# Patient Record
Sex: FEMALE | Race: BLACK OR AFRICAN AMERICAN | NOT HISPANIC OR LATINO | URBAN - METROPOLITAN AREA
[De-identification: names, ages, dates, MRNs, and addresses within clinical notes are randomized per-mention and may not be internally consistent; named-entity substitution may affect disease eponyms.]

---

## 2020-07-25 ENCOUNTER — TELEPHONE ENCOUNTER (OUTPATIENT)
Dept: URBAN - METROPOLITAN AREA CLINIC 13 | Facility: CLINIC | Age: 75
End: 2020-07-25

## 2020-07-25 RX ORDER — LUBIPROSTONE 8 UG/1
TAKE 1 CAPSULE TWICE DAILY CAPSULE, GELATIN COATED ORAL
Qty: 60 | Refills: 6 | OUTPATIENT
Start: 2012-03-27 | End: 2012-04-24

## 2020-07-25 RX ORDER — ALLOPURINOL 100 MG/1
TAKE 1 TABLET DAILY TABLET ORAL
Refills: 0 | OUTPATIENT
Start: 2011-08-23 | End: 2017-06-14

## 2020-07-25 RX ORDER — NITROFURANTOIN MONOHYDRATE/MACROCRYSTALLINE 25; 75 MG/1; MG/1
CAPSULE ORAL
Qty: 14 | Refills: 0 | OUTPATIENT
Start: 2012-05-14 | End: 2012-11-19

## 2020-07-25 RX ORDER — ALBUTEROL SULFATE 108 UG/1
INHALE 2 PUFFS FOUR TIMES DAILY AS DIRECTED AEROSOL, METERED RESPIRATORY (INHALATION)
Refills: 0 | OUTPATIENT
End: 2012-11-19

## 2020-07-25 RX ORDER — VALSARTAN 320 MG/1
TAKE 0.5 TABLET DAILY TABLET ORAL
Refills: 0 | OUTPATIENT
Start: 2011-08-01 | End: 2017-06-14

## 2020-07-25 RX ORDER — FAMOTIDINE 40 MG/1
TAKE 1 TABLET DAILY TABLET ORAL
Qty: 30 | Refills: 11 | OUTPATIENT
Start: 2012-05-21 | End: 2019-06-25

## 2020-07-25 RX ORDER — DEXLANSOPRAZOLE 60 MG/1
TAKE 1 CAPSULE BY MOUTH EACH MORNING CAPSULE, DELAYED RELEASE ORAL
Refills: 3 | OUTPATIENT
End: 2018-10-25

## 2020-07-25 RX ORDER — GABAPENTIN 300 MG/1
TAKE 1 CAPSULE DAILY PRN CAPSULE ORAL
Refills: 0 | OUTPATIENT
End: 2018-08-20

## 2020-07-25 RX ORDER — ALBUTEROL SULFATE 1.25 MG/3ML
USE 1 VIAL IN NEBULIZER 3 TIMES DAILY AS NEEDED, PT STATES UNKNOWN DOSE SOLUTION INTRABRONCHIAL
Refills: 0 | OUTPATIENT
End: 2019-02-27

## 2020-07-25 RX ORDER — THEOPHYLLINE ANHYDROUS 400 MG/1
TAKE 1 CAPSULE DAILY CAPSULE, EXTENDED RELEASE ORAL
Refills: 0 | OUTPATIENT
End: 2012-11-19

## 2020-07-25 RX ORDER — DEXLANSOPRAZOLE 60 MG/1
TAKE 1 CAPSULE DAILY EVERY MORNING BEFORE BREAKFAST CAPSULE, DELAYED RELEASE ORAL
Qty: 30 | Refills: 1 | OUTPATIENT
Start: 2012-02-27 | End: 2012-04-24

## 2020-07-25 RX ORDER — LORAZEPAM 1 MG/1
TAKE 1 TABLET EVERY 8 HOURS PRN TABLET ORAL
Qty: 90 | Refills: 0 | OUTPATIENT
Start: 2011-09-21 | End: 2018-08-20

## 2020-07-25 RX ORDER — ROSUVASTATIN CALCIUM 20 MG
TABLET ORAL
Qty: 30 | Refills: 0 | OUTPATIENT
Start: 2011-11-23 | End: 2012-02-27

## 2020-07-25 RX ORDER — COLCHICINE 0.6 MG/1
TAKE 1 TABLET DAILY TABLET, FILM COATED ORAL
Qty: 3 | Refills: 0 | OUTPATIENT
Start: 2012-01-24 | End: 2012-11-19

## 2020-07-25 RX ORDER — FERROUS SULFATE 137(45) MG
TAKE 1 TABLET DAILY TABLET, EXTENDED RELEASE ORAL
Refills: 0 | OUTPATIENT
End: 2012-12-31

## 2020-07-26 ENCOUNTER — TELEPHONE ENCOUNTER (OUTPATIENT)
Dept: URBAN - METROPOLITAN AREA CLINIC 13 | Facility: CLINIC | Age: 75
End: 2020-07-26

## 2020-07-26 RX ORDER — OFLOXACIN 3 MG/ML
INSTILL 10 DROPS INTO AFFECTED EAR EVERY DAY FOR 7 DAYS SOLUTION AURICULAR (OTIC)
Qty: 10 | Refills: 0 | Status: ACTIVE | COMMUNITY
Start: 2018-08-31

## 2020-07-26 RX ORDER — CIPROFLOXACIN AND DEXAMETHASONE 3; 1 MG/ML; MG/ML
SUSPENSION/ DROPS AURICULAR (OTIC)
Qty: 8 | Refills: 0 | Status: ACTIVE | COMMUNITY
Start: 2012-12-27

## 2020-07-26 RX ORDER — OLOPATADINE HYDROCHLORIDE 7 MG/ML
APPLY 1 DROP INTO EACH EYE ONCE A DAY SOLUTION OPHTHALMIC
Qty: 3 | Refills: 0 | Status: ACTIVE | COMMUNITY
Start: 2018-03-02

## 2020-07-26 RX ORDER — LOSARTAN POTASSIUM 25 MG/1
TAKE 1 TABLET BY MOUTH EVERY DAY. THIS TAKES THE PLACE OF VALSARTAN TABLET, FILM COATED ORAL
Qty: 30 | Refills: 0 | Status: ACTIVE | COMMUNITY
Start: 2018-08-21

## 2020-07-26 RX ORDER — VALSARTAN 160 MG/1
TAKE 1 TABLET DAILY TABLET ORAL
Refills: 0 | Status: ACTIVE | COMMUNITY

## 2020-07-26 RX ORDER — DIPHENOXYLATE HYDROCHLORIDE AND ATROPINE SULFATE 2.5; .025 MG/1; MG/1
TAKE AS DIRECTED EVERY 8 HOURS AS NEEDED FOR DIARRHEA TABLET ORAL
Qty: 30 | Refills: 0 | Status: ACTIVE | COMMUNITY
Start: 2012-05-24

## 2020-07-26 RX ORDER — TIOTROPIUM BROMIDE 18 UG/1
INHALE CONTENTS OF CAPSULE ONCE DAY CAPSULE ORAL; RESPIRATORY (INHALATION)
Refills: 0 | Status: ACTIVE | COMMUNITY

## 2020-07-26 RX ORDER — FLUTICASONE PROPIONATE AND SALMETEROL 50; 250 UG/1; UG/1
INHALE 1 PUFFS DAILY POWDER RESPIRATORY (INHALATION)
Refills: 0 | Status: ACTIVE | COMMUNITY

## 2020-07-26 RX ORDER — LORAZEPAM 0.5 MG/1
TAKE 1 TABLET BY MOUTH AT BEDTIME AS NEEDED TABLET ORAL
Qty: 15 | Refills: 0 | Status: ACTIVE | COMMUNITY
Start: 2018-09-04

## 2020-07-26 RX ORDER — NYSTATIN 100000 [USP'U]/ML
SWISH AND SWALLOW 2 TEASPOONSFULS (10ML) BY MOUTH 3 TIMES A DAY FOR 7 SUSPENSION ORAL
Qty: 210 | Refills: 0 | Status: ACTIVE | COMMUNITY
Start: 2011-12-01

## 2020-07-26 RX ORDER — AZITHROMYCIN DIHYDRATE 250 MG/1
TAKE 2 TABLETS BY MOUTH TODAY, THEN TAKE 1 TABLET DAILY FOR 4 DAYS TABLET, FILM COATED ORAL
Qty: 6 | Refills: 0 | Status: ACTIVE | COMMUNITY
Start: 2019-01-20

## 2020-07-26 RX ORDER — FLUTICASONE PROPIONATE AND SALMETEROL 50; 250 UG/1; UG/1
POWDER RESPIRATORY (INHALATION)
Qty: 60 | Refills: 0 | Status: ACTIVE | COMMUNITY
Start: 2011-11-19

## 2020-07-26 RX ORDER — CETIRIZINE HYDROCHLORIDE 10 MG/1
TAKE 1 TABLET BY MOUTH EVERY DAY TABLET, FILM COATED ORAL
Qty: 30 | Refills: 0 | Status: ACTIVE | COMMUNITY
Start: 2018-07-19

## 2020-07-26 RX ORDER — HYDROCODONE BITARTRATE AND ACETAMINOPHEN 5; 325 MG/1; MG/1
TAKE 1 TABLET BY MOUTH TWICE A DAY AS NEEDED FOR 10 DAYS TABLET ORAL
Qty: 20 | Refills: 0 | Status: ACTIVE | COMMUNITY
Start: 2019-01-14

## 2020-07-26 RX ORDER — MONTELUKAST SODIUM 10 MG/1
TAKE 1 TABLET BY MOUTH EVERY DAY TABLET, FILM COATED ORAL
Qty: 30 | Refills: 0 | Status: ACTIVE | COMMUNITY
Start: 2018-01-25

## 2020-07-26 RX ORDER — DICYCLOMINE HYDROCHLORIDE 10 MG/1
TAKE 1 CAPSULE EVERY 6 HOURS PRN ABDOMINAL PAIN CAPSULE ORAL
Qty: 60 | Refills: 3 | Status: ACTIVE | COMMUNITY
Start: 2018-10-25

## 2020-07-26 RX ORDER — METRONIDAZOLE 250 MG/1
TAKE 1 TABLET BY MOUTH 4 TIMES A DAY AT MEALS & BEDTIME FOR 14 DAYS TABLET ORAL
Qty: 56 | Refills: 0 | Status: ACTIVE | COMMUNITY
Start: 2011-11-09

## 2020-07-26 RX ORDER — FEBUXOSTAT 80 MG/1
TAKE 1 TABLET BY MOUTH EVERY DAY AS NEEDED TABLET ORAL
Qty: 30 | Refills: 0 | Status: ACTIVE | COMMUNITY
Start: 2018-11-01

## 2020-07-26 RX ORDER — DEXAMETHASONE 4 MG/1
TAKE 1 TABLET BY MOUTH EVERY DAY TABLET ORAL
Qty: 4 | Refills: 0 | Status: ACTIVE | COMMUNITY
Start: 2018-07-22

## 2020-07-26 RX ORDER — OXYCODONE AND ACETAMINOPHEN 5; 325 MG/1; MG/1
TAKE 1 TABLET BY MOUTH EVERY 4 TO 6 HOURS AS NEEDED FOR PAIN TABLET ORAL
Qty: 15 | Refills: 0 | Status: ACTIVE | COMMUNITY
Start: 2011-03-22

## 2020-07-26 RX ORDER — CHLORHEXIDINE GLUCONATE 4 %
TAKE 1 TABLET DAILY LIQUID (ML) TOPICAL
Qty: 30 | Refills: 0 | Status: ACTIVE | COMMUNITY

## 2020-07-26 RX ORDER — CHLORTHALIDONE 25 MG/1
TAKE 1 TABLET DAILY TABLET ORAL
Refills: 0 | Status: ACTIVE | COMMUNITY
Start: 2011-12-10

## 2020-07-26 RX ORDER — ESTRADIOL 10 UG/1
INSERT 1 TABLET VAGINALLY EVERY OTHER DAY AT BEDTIME FOR 2 WEEKS THEN INSERT VAGINAL
Qty: 16 | Refills: 0 | Status: ACTIVE | COMMUNITY
Start: 2011-04-14

## 2020-07-26 RX ORDER — FLUTICASONE PROPIONATE 50 UG/1
INSERT 1 SPRAY INTO EACH NOSTRIL ONCE DAILY SPRAY, METERED NASAL
Qty: 16 | Refills: 0 | Status: ACTIVE | COMMUNITY
Start: 2018-03-27

## 2020-07-26 RX ORDER — PREDNISONE 20 MG/1
TABLET ORAL
Qty: 5 | Refills: 0 | Status: ACTIVE | COMMUNITY
Start: 2012-04-27

## 2020-07-26 RX ORDER — FLUOCINONIDE 0.5 MG/G
USE AS DRIECTED CREAM TOPICAL
Qty: 30 | Refills: 0 | Status: ACTIVE | COMMUNITY
Start: 2011-07-26

## 2020-07-26 RX ORDER — ROSUVASTATIN CALCIUM 5 MG
TAKE 1 TABLET DAILY TABLET ORAL
Refills: 0 | Status: ACTIVE | COMMUNITY
Start: 2012-02-09

## 2020-07-26 RX ORDER — ONDANSETRON 4 MG/1
1 TABLET ODT EVERY 4-6 HOURS AS NEEDED FOR NAUSEA TABLET, ORALLY DISINTEGRATING ORAL
Qty: 40 | Refills: 2 | Status: ACTIVE | COMMUNITY
Start: 2018-10-25

## 2020-07-26 RX ORDER — ROSUVASTATIN CALCIUM 40 MG/1
TABLET, FILM COATED ORAL
Qty: 30 | Refills: 0 | Status: ACTIVE | COMMUNITY
Start: 2012-01-12

## 2020-07-26 RX ORDER — FUROSEMIDE 80 MG/1
TAKE 1 TABLET DAILY AS DIRECTED. DOSE UNKNOWN PER PT TABLET ORAL
Refills: 0 | Status: ACTIVE | COMMUNITY
Start: 2011-09-18

## 2020-07-26 RX ORDER — HYDROCODONE POLISTIREX AND CHLORPHENIRAMINE POLISTIREX 10; 8 MG/5ML; MG/5ML
TAKE 5ML BY MOUTH EVERY 12 HOURS FOR 10 DAYS SUSPENSION, EXTENDED RELEASE ORAL
Qty: 100 | Refills: 0 | Status: ACTIVE | COMMUNITY
Start: 2018-02-19

## 2020-07-26 RX ORDER — CLONAZEPAM 1 MG/1
TAKE 1/2 TABLET AT BEDTIME FOR 1 WEEK THEN 1 TABLET AT BEDTIME TABLET ORAL
Qty: 30 | Refills: 0 | Status: ACTIVE | COMMUNITY
Start: 2012-08-16

## 2020-07-26 RX ORDER — ADHESIVE BANDAGE 2"X4"
TAKE 2 TABLETS BY MOUTH 4 TIMES DAILY AT MEALS & BEDTIME FOR 14 DAYS BANDAGE TOPICAL
Qty: 120 | Refills: 0 | Status: ACTIVE | COMMUNITY
Start: 2011-11-09

## 2020-07-26 RX ORDER — GABAPENTIN 300 MG/1
CAPSULE ORAL
Qty: 20 | Refills: 0 | Status: ACTIVE | COMMUNITY
Start: 2012-07-14

## 2020-07-26 RX ORDER — ALCAFTADINE 2.5 MG/ML
INSTILL 1 DROP BY OPHTHALMIC ROUTE EVERY DAY INTO AFFECTED EYE(S) SOLUTION/ DROPS OPHTHALMIC
Qty: 3 | Refills: 0 | Status: ACTIVE | COMMUNITY
Start: 2011-12-15

## 2020-07-26 RX ORDER — FAMOTIDINE 40 MG/1
TAKE 1 TABLET DAILY TABLET ORAL
Qty: 90 | Refills: 3 | Status: ACTIVE | COMMUNITY
Start: 2019-06-25

## 2020-07-26 RX ORDER — SUCRALFATE 1 G/1
TAKE 1 TABLET BY MOUTH 3 TIMES A DAY TABLET ORAL
Qty: 90 | Refills: 0 | Status: ACTIVE | COMMUNITY
Start: 2011-12-01

## 2020-07-26 RX ORDER — TIOTROPIUM BROMIDE INHALATION SPRAY 3.12 UG/1
INHALE 1 PUFF EVERY MORNING AS DIRECTED SPRAY, METERED RESPIRATORY (INHALATION)
Qty: 4 | Refills: 0 | Status: ACTIVE | COMMUNITY
Start: 2018-09-06

## 2020-07-26 RX ORDER — OXYCODONE AND ACETAMINOPHEN 5; 325 MG/1; MG/1
TAKE 1/2-1 TABLET EVERY 6 HOURS AS NEEDED TABLET ORAL
Qty: 10 | Refills: 0 | Status: ACTIVE | COMMUNITY
Start: 2018-07-22

## 2020-07-26 RX ORDER — NICOTINE POLACRILEX 2 MG
USE AS DIRECTED TO TEST TWICE A DAY AND AS NEEDED LOZENGE BUCCAL
Qty: 50 | Refills: 0 | Status: ACTIVE | COMMUNITY
Start: 2011-12-12

## 2020-07-26 RX ORDER — MECLIZINE HYDROCHLORIDE 12.5 MG/1
TAKE 1 TO 2 TABLETS BY MOUTH EVERY 8 HOURS AS NEEDED FOR DIZZINESS TABLET ORAL
Qty: 30 | Refills: 0 | Status: ACTIVE | COMMUNITY
Start: 2018-06-25

## 2020-07-26 RX ORDER — ALBUTEROL SULFATE 108 UG/1
AEROSOL, METERED RESPIRATORY (INHALATION)
Qty: 20 | Refills: 0 | Status: ACTIVE | COMMUNITY
Start: 2011-11-19

## 2020-07-26 RX ORDER — FEBUXOSTAT 40 MG/1
TAKE ONE TABLET BY MOUTH ONCE DAILY TABLET ORAL
Qty: 30 | Refills: 0 | Status: ACTIVE | COMMUNITY
Start: 2017-06-21

## 2020-07-26 RX ORDER — DARIFENACIN 7.5 MG/1
TAKE 1 TABLET BY MOUTH EVERY DAY TABLET, EXTENDED RELEASE ORAL
Qty: 30 | Refills: 0 | Status: ACTIVE | COMMUNITY
Start: 2011-07-14

## 2020-07-26 RX ORDER — VALSARTAN 80 MG/1
TAKE 1 TABLET BY MOUTH EVERY DAY TABLET, FILM COATED ORAL
Qty: 90 | Refills: 0 | Status: ACTIVE | COMMUNITY
Start: 2018-11-01

## 2021-01-04 ENCOUNTER — OFFICE VISIT (OUTPATIENT)
Dept: URBAN - METROPOLITAN AREA CLINIC 113 | Facility: CLINIC | Age: 76
End: 2021-01-04

## 2021-06-20 ENCOUNTER — ERX REFILL RESPONSE (OUTPATIENT)
Dept: URBAN - METROPOLITAN AREA CLINIC 113 | Facility: CLINIC | Age: 76
End: 2021-06-20

## 2021-06-20 RX ORDER — FAMOTIDINE 40 MG/1
TAKE 1 TABLET DAILY TABLET, FILM COATED ORAL
Qty: 90 | Refills: 3

## 2022-03-03 ENCOUNTER — OFFICE VISIT (OUTPATIENT)
Dept: URBAN - METROPOLITAN AREA CLINIC 113 | Facility: CLINIC | Age: 77
End: 2022-03-03

## 2022-04-08 ENCOUNTER — WEB ENCOUNTER (OUTPATIENT)
Dept: URBAN - METROPOLITAN AREA CLINIC 113 | Facility: CLINIC | Age: 77
End: 2022-04-08

## 2022-04-08 ENCOUNTER — OFFICE VISIT (OUTPATIENT)
Dept: URBAN - METROPOLITAN AREA CLINIC 113 | Facility: CLINIC | Age: 77
End: 2022-04-08
Payer: MEDICARE

## 2022-04-08 VITALS
DIASTOLIC BLOOD PRESSURE: 85 MMHG | RESPIRATION RATE: 20 BRPM | TEMPERATURE: 97.7 F | WEIGHT: 176 LBS | BODY MASS INDEX: 27.62 KG/M2 | HEART RATE: 77 BPM | HEIGHT: 67 IN | SYSTOLIC BLOOD PRESSURE: 153 MMHG

## 2022-04-08 DIAGNOSIS — Z86.010 HISTORY OF ADENOMATOUS POLYP OF COLON: ICD-10-CM

## 2022-04-08 DIAGNOSIS — K59.09 CHRONIC CONSTIPATION: ICD-10-CM

## 2022-04-08 DIAGNOSIS — R10.30 LOWER ABDOMINAL PAIN: ICD-10-CM

## 2022-04-08 PROCEDURE — 99213 OFFICE O/P EST LOW 20 MIN: CPT | Performed by: INTERNAL MEDICINE

## 2022-04-08 RX ORDER — AZITHROMYCIN DIHYDRATE 250 MG/1
TAKE 2 TABLETS BY MOUTH TODAY, THEN TAKE 1 TABLET DAILY FOR 4 DAYS TABLET, FILM COATED ORAL
Qty: 6 | Refills: 0 | Status: ACTIVE | COMMUNITY
Start: 2019-01-20

## 2022-04-08 RX ORDER — CLONAZEPAM 1 MG/1
TAKE 1/2 TABLET AT BEDTIME FOR 1 WEEK THEN 1 TABLET AT BEDTIME TABLET ORAL
Qty: 30 | Refills: 0 | Status: ACTIVE | COMMUNITY
Start: 2012-08-16

## 2022-04-08 RX ORDER — OXYCODONE AND ACETAMINOPHEN 5; 325 MG/1; MG/1
TAKE 1 TABLET BY MOUTH EVERY 4 TO 6 HOURS AS NEEDED FOR PAIN TABLET ORAL
Qty: 15 | Refills: 0 | Status: ACTIVE | COMMUNITY
Start: 2011-03-22

## 2022-04-08 RX ORDER — ROSUVASTATIN CALCIUM 40 MG/1
TABLET, FILM COATED ORAL
Qty: 30 | Refills: 0 | Status: ACTIVE | COMMUNITY
Start: 2012-01-12

## 2022-04-08 RX ORDER — FLUTICASONE PROPIONATE AND SALMETEROL 50; 250 UG/1; UG/1
INHALE 1 PUFFS DAILY POWDER RESPIRATORY (INHALATION)
Refills: 0 | Status: ACTIVE | COMMUNITY

## 2022-04-08 RX ORDER — VALSARTAN 80 MG/1
TAKE 1 TABLET BY MOUTH EVERY DAY TABLET, FILM COATED ORAL
Qty: 90 | Refills: 0 | Status: ACTIVE | COMMUNITY
Start: 2018-11-01

## 2022-04-08 RX ORDER — GABAPENTIN 300 MG/1
CAPSULE ORAL
Qty: 20 | Refills: 0 | Status: ACTIVE | COMMUNITY
Start: 2012-07-14

## 2022-04-08 RX ORDER — SUCRALFATE 1 G/1
TAKE 1 TABLET BY MOUTH 3 TIMES A DAY TABLET ORAL
Qty: 90 | Refills: 0 | Status: ACTIVE | COMMUNITY
Start: 2011-12-01

## 2022-04-08 RX ORDER — FUROSEMIDE 80 MG/1
TAKE 1 TABLET DAILY AS DIRECTED. DOSE UNKNOWN PER PT TABLET ORAL
Refills: 0 | Status: ACTIVE | COMMUNITY
Start: 2011-09-18

## 2022-04-08 RX ORDER — LOSARTAN POTASSIUM 25 MG/1
TAKE 1 TABLET BY MOUTH EVERY DAY. THIS TAKES THE PLACE OF VALSARTAN TABLET, FILM COATED ORAL
Qty: 30 | Refills: 0 | Status: ACTIVE | COMMUNITY
Start: 2018-08-21

## 2022-04-08 RX ORDER — CIPROFLOXACIN AND DEXAMETHASONE 3; 1 MG/ML; MG/ML
SUSPENSION/ DROPS AURICULAR (OTIC)
Qty: 8 | Refills: 0 | Status: ACTIVE | COMMUNITY
Start: 2012-12-27

## 2022-04-08 RX ORDER — HYDROCODONE BITARTRATE AND ACETAMINOPHEN 5; 325 MG/1; MG/1
TAKE 1 TABLET BY MOUTH TWICE A DAY AS NEEDED FOR 10 DAYS TABLET ORAL
Qty: 20 | Refills: 0 | Status: ACTIVE | COMMUNITY
Start: 2019-01-14

## 2022-04-08 RX ORDER — CETIRIZINE HYDROCHLORIDE 10 MG/1
TAKE 1 TABLET BY MOUTH EVERY DAY TABLET, FILM COATED ORAL
Qty: 30 | Refills: 0 | Status: ACTIVE | COMMUNITY
Start: 2018-07-19

## 2022-04-08 RX ORDER — TIOTROPIUM BROMIDE INHALATION SPRAY 3.12 UG/1
INHALE 1 PUFF EVERY MORNING AS DIRECTED SPRAY, METERED RESPIRATORY (INHALATION)
Qty: 4 | Refills: 0 | Status: ACTIVE | COMMUNITY
Start: 2018-09-06

## 2022-04-08 RX ORDER — PREDNISONE 20 MG/1
TABLET ORAL
Qty: 5 | Refills: 0 | Status: ACTIVE | COMMUNITY
Start: 2012-04-27

## 2022-04-08 RX ORDER — ADHESIVE BANDAGE 2"X4"
TAKE 2 TABLETS BY MOUTH 4 TIMES DAILY AT MEALS & BEDTIME FOR 14 DAYS BANDAGE TOPICAL
Qty: 120 | Refills: 0 | Status: ACTIVE | COMMUNITY
Start: 2011-11-09

## 2022-04-08 RX ORDER — ESTRADIOL 10 UG/1
_INSERT 1 TABLET VAGINALLY EVERY OTHER DAY AT BEDTIME FOR 2 WEEKS THEN INSERT VAGINAL
Qty: 16 | Refills: 0 | Status: ACTIVE | COMMUNITY
Start: 2011-04-14

## 2022-04-08 RX ORDER — DIPHENOXYLATE HYDROCHLORIDE AND ATROPINE SULFATE 2.5; .025 MG/1; MG/1
TAKE AS DIRECTED EVERY 8 HOURS AS NEEDED FOR DIARRHEA TABLET ORAL
Qty: 30 | Refills: 0 | Status: ACTIVE | COMMUNITY
Start: 2012-05-24

## 2022-04-08 RX ORDER — DEXAMETHASONE 4 MG/1
TAKE 1 TABLET BY MOUTH EVERY DAY TABLET ORAL
Qty: 4 | Refills: 0 | Status: ACTIVE | COMMUNITY
Start: 2018-07-22

## 2022-04-08 RX ORDER — DICYCLOMINE HYDROCHLORIDE 10 MG/1
TAKE 1 CAPSULE EVERY 6 HOURS PRN ABDOMINAL PAIN CAPSULE ORAL
Qty: 60 | Refills: 3 | Status: ACTIVE | COMMUNITY
Start: 2018-10-25

## 2022-04-08 RX ORDER — VALSARTAN 160 MG/1
TAKE 1 TABLET DAILY TABLET ORAL
Refills: 0 | Status: ACTIVE | COMMUNITY

## 2022-04-08 RX ORDER — ROSUVASTATIN CALCIUM 5 MG
TAKE 1 TABLET DAILY TABLET ORAL
Refills: 0 | Status: ACTIVE | COMMUNITY
Start: 2012-02-09

## 2022-04-08 RX ORDER — NYSTATIN 100000 [USP'U]/ML
SWISH AND SWALLOW 2 TEASPOONSFULS (10ML) BY MOUTH 3 TIMES A DAY FOR 7 SUSPENSION ORAL
Qty: 210 | Refills: 0 | Status: ACTIVE | COMMUNITY
Start: 2011-12-01

## 2022-04-08 RX ORDER — METRONIDAZOLE 250 MG/1
TAKE 1 TABLET BY MOUTH 4 TIMES A DAY AT MEALS & BEDTIME FOR 14 DAYS TABLET ORAL
Qty: 56 | Refills: 0 | Status: ACTIVE | COMMUNITY
Start: 2011-11-09

## 2022-04-08 RX ORDER — FEBUXOSTAT 80 MG/1
TAKE 1 TABLET BY MOUTH EVERY DAY AS NEEDED TABLET ORAL
Qty: 30 | Refills: 0 | Status: ACTIVE | COMMUNITY
Start: 2018-11-01

## 2022-04-08 RX ORDER — FEBUXOSTAT 40 MG/1
TAKE ONE TABLET BY MOUTH ONCE DAILY TABLET ORAL
Qty: 30 | Refills: 0 | Status: ACTIVE | COMMUNITY
Start: 2017-06-21

## 2022-04-08 RX ORDER — HYDROCODONE POLISTIREX AND CHLORPHENIRAMINE POLISTIREX 10; 8 MG/5ML; MG/5ML
TAKE 5ML BY MOUTH EVERY 12 HOURS FOR 10 DAYS SUSPENSION, EXTENDED RELEASE ORAL
Qty: 100 | Refills: 0 | Status: ACTIVE | COMMUNITY
Start: 2018-02-19

## 2022-04-08 RX ORDER — MECLIZINE HYDROCHLORIDE 12.5 MG/1
TAKE 1 TO 2 TABLETS BY MOUTH EVERY 8 HOURS AS NEEDED FOR DIZZINESS TABLET ORAL
Qty: 30 | Refills: 0 | Status: ACTIVE | COMMUNITY
Start: 2018-06-25

## 2022-04-08 RX ORDER — OFLOXACIN 3 MG/ML
INSTILL 10 DROPS INTO AFFECTED EAR EVERY DAY FOR 7 DAYS SOLUTION AURICULAR (OTIC)
Qty: 10 | Refills: 0 | Status: ACTIVE | COMMUNITY
Start: 2018-08-31

## 2022-04-08 RX ORDER — DARIFENACIN 7.5 MG/1
TAKE 1 TABLET BY MOUTH EVERY DAY TABLET, EXTENDED RELEASE ORAL
Qty: 30 | Refills: 0 | Status: ACTIVE | COMMUNITY
Start: 2011-07-14

## 2022-04-08 RX ORDER — FLUTICASONE PROPIONATE 50 UG/1
_INSERT 1 SPRAY INTO EACH NOSTRIL ONCE DAILY SPRAY, METERED NASAL
Qty: 16 | Refills: 0 | Status: ACTIVE | COMMUNITY
Start: 2018-03-27

## 2022-04-08 RX ORDER — ALCAFTADINE 2.5 MG/ML
INSTILL 1 DROP BY OPHTHALMIC ROUTE EVERY DAY INTO AFFECTED EYE(S) SOLUTION/ DROPS OPHTHALMIC
Qty: 3 | Refills: 0 | Status: ACTIVE | COMMUNITY
Start: 2011-12-15

## 2022-04-08 RX ORDER — ONDANSETRON 4 MG/1
1 TABLET ODT EVERY 4-6 HOURS AS NEEDED FOR NAUSEA TABLET, ORALLY DISINTEGRATING ORAL
Qty: 40 | Refills: 2 | Status: ACTIVE | COMMUNITY
Start: 2018-10-25

## 2022-04-08 RX ORDER — CHLORTHALIDONE 25 MG/1
TAKE 1 TABLET DAILY TABLET ORAL
Refills: 0 | Status: ACTIVE | COMMUNITY
Start: 2011-12-10

## 2022-04-08 RX ORDER — FLUOCINONIDE 0.5 MG/G
USE AS DRIECTED CREAM TOPICAL
Qty: 30 | Refills: 0 | Status: ACTIVE | COMMUNITY
Start: 2011-07-26

## 2022-04-08 RX ORDER — CHLORHEXIDINE GLUCONATE 4 %
TAKE 1 TABLET DAILY LIQUID (ML) TOPICAL
Qty: 30 | Refills: 0 | Status: ACTIVE | COMMUNITY

## 2022-04-08 RX ORDER — FAMOTIDINE 40 MG/1
TAKE 1 TABLET DAILY TABLET, FILM COATED ORAL
Qty: 90 | Refills: 3 | Status: ACTIVE | COMMUNITY

## 2022-04-08 RX ORDER — LORAZEPAM 0.5 MG/1
TAKE 1 TABLET BY MOUTH AT BEDTIME AS NEEDED TABLET ORAL
Qty: 15 | Refills: 0 | Status: ACTIVE | COMMUNITY
Start: 2018-09-04

## 2022-04-08 RX ORDER — OLOPATADINE HYDROCHLORIDE 7 MG/ML
APPLY 1 DROP INTO EACH EYE ONCE A DAY SOLUTION OPHTHALMIC
Qty: 3 | Refills: 0 | Status: ACTIVE | COMMUNITY
Start: 2018-03-02

## 2022-04-08 RX ORDER — TIOTROPIUM BROMIDE 18 UG/1
INHALE CONTENTS OF CAPSULE ONCE DAY CAPSULE ORAL; RESPIRATORY (INHALATION)
Refills: 0 | Status: ACTIVE | COMMUNITY

## 2022-04-08 RX ORDER — MONTELUKAST SODIUM 10 MG/1
TAKE 1 TABLET BY MOUTH EVERY DAY TABLET, FILM COATED ORAL
Qty: 30 | Refills: 0 | Status: ACTIVE | COMMUNITY
Start: 2018-01-25

## 2022-04-08 RX ORDER — ALBUTEROL SULFATE 108 UG/1
AEROSOL, METERED RESPIRATORY (INHALATION)
Qty: 20 | Refills: 0 | Status: ACTIVE | COMMUNITY
Start: 2011-11-19

## 2022-04-08 RX ORDER — NICOTINE POLACRILEX 2 MG
USE AS DIRECTED TO TEST TWICE A DAY AND AS NEEDED LOZENGE BUCCAL
Qty: 50 | Refills: 0 | Status: ACTIVE | COMMUNITY
Start: 2011-12-12

## 2022-04-08 NOTE — HPI-OTHER HISTORIES
EGD (10/8/18): irregular Z line, small hiatal hernia and patchy mildly erythematous mucosa in the gastric antrum and prepyloric region of the stomach. The examined duodenum was normal. Gastric biopsies show reactive gastropathy, negative for H. pylori. CT A/P (8/27/18): nonspecific wall thickening of the gastric pylorus and duodenal bulb, diverticulosis without evidence of diverticulitis, small left renal cyst. Due to these abnormal findings, an EGD was ordered. Colonoscopy (4/24/12): difficult exam due to looping, removal of sigmoid and transverse colon 5 mm tubular adenomas.
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
## 2022-04-25 ENCOUNTER — ERX REFILL RESPONSE (OUTPATIENT)
Dept: URBAN - METROPOLITAN AREA CLINIC 113 | Facility: CLINIC | Age: 77
End: 2022-04-25

## 2022-04-25 RX ORDER — FAMOTIDINE 40 MG/1
TAKE 1 TABLET DAILY TABLET, FILM COATED ORAL
Qty: 90 | Refills: 3 | OUTPATIENT

## 2022-04-25 RX ORDER — FAMOTIDINE 40 MG/1
TAKE 1 TABLET BY MOUTH EVERY DAY TABLET, FILM COATED ORAL
Qty: 90 TABLET | Refills: 4 | OUTPATIENT

## 2022-04-28 ENCOUNTER — TELEPHONE ENCOUNTER (OUTPATIENT)
Dept: URBAN - METROPOLITAN AREA CLINIC 113 | Facility: CLINIC | Age: 77
End: 2022-04-28

## 2022-04-28 PROBLEM — 54586004: Status: ACTIVE | Noted: 2022-04-08

## 2022-04-28 PROBLEM — 429047008: Status: ACTIVE | Noted: 2022-04-28

## 2022-04-28 PROBLEM — 236069009: Status: ACTIVE | Noted: 2022-04-08

## 2022-05-20 ENCOUNTER — OFFICE VISIT (OUTPATIENT)
Dept: URBAN - METROPOLITAN AREA CLINIC 113 | Facility: CLINIC | Age: 77
End: 2022-05-20

## 2022-08-22 ENCOUNTER — OFFICE VISIT (OUTPATIENT)
Dept: URBAN - METROPOLITAN AREA CLINIC 113 | Facility: CLINIC | Age: 77
End: 2022-08-22
Payer: MEDICARE

## 2022-08-22 VITALS
HEART RATE: 74 BPM | SYSTOLIC BLOOD PRESSURE: 129 MMHG | WEIGHT: 175 LBS | TEMPERATURE: 97.8 F | BODY MASS INDEX: 27.47 KG/M2 | HEIGHT: 67 IN | DIASTOLIC BLOOD PRESSURE: 68 MMHG

## 2022-08-22 DIAGNOSIS — K21.9 GERD: ICD-10-CM

## 2022-08-22 DIAGNOSIS — R13.10 DYSPHAGIA: ICD-10-CM

## 2022-08-22 DIAGNOSIS — K58.1 IRRITABLE BOWEL SYNDROME WITH CONSTIPATION: ICD-10-CM

## 2022-08-22 PROBLEM — 440630006 IRRITABLE BOWEL SYNDROME CHARACTERIZED BY CONSTIPATION: Status: ACTIVE | Noted: 2022-08-22

## 2022-08-22 PROBLEM — 40739000 DYSPHAGIA: Status: ACTIVE | Noted: 2022-08-22

## 2022-08-22 PROCEDURE — 99214 OFFICE O/P EST MOD 30 MIN: CPT | Performed by: NURSE PRACTITIONER

## 2022-08-22 RX ORDER — AZITHROMYCIN DIHYDRATE 250 MG/1
TAKE 2 TABLETS BY MOUTH TODAY, THEN TAKE 1 TABLET DAILY FOR 4 DAYS TABLET, FILM COATED ORAL
Qty: 6 | Refills: 0 | Status: ON HOLD | COMMUNITY
Start: 2019-01-20

## 2022-08-22 RX ORDER — FEBUXOSTAT 80 MG/1
TAKE 1 TABLET BY MOUTH EVERY DAY AS NEEDED TABLET ORAL
Qty: 30 | Refills: 0 | Status: ACTIVE | COMMUNITY
Start: 2018-11-01

## 2022-08-22 RX ORDER — ALBUTEROL SULFATE 0.63 MG/3ML
AS DIRECTED SOLUTION RESPIRATORY (INHALATION)
Status: ACTIVE | COMMUNITY

## 2022-08-22 RX ORDER — FAMOTIDINE 40 MG/1
1 TABLET AT BEDTIME TABLET, FILM COATED ORAL ONCE A DAY
Status: ACTIVE | COMMUNITY

## 2022-08-22 RX ORDER — CHLORTHALIDONE 25 MG/1
TAKE 1 TABLET DAILY TABLET ORAL
Refills: 0 | Status: ON HOLD | COMMUNITY
Start: 2011-12-10

## 2022-08-22 RX ORDER — ALBUTEROL SULFATE 108 UG/1
AEROSOL, METERED RESPIRATORY (INHALATION)
Qty: 20 | Refills: 0 | Status: ACTIVE | COMMUNITY
Start: 2011-11-19

## 2022-08-22 RX ORDER — DICYCLOMINE HYDROCHLORIDE 10 MG/1
1 CAPSULE CAPSULE ORAL
Qty: 60 | Refills: 1 | OUTPATIENT
Start: 2022-08-22 | End: 2022-10-21

## 2022-08-22 RX ORDER — FEBUXOSTAT 40 MG/1
TAKE ONE TABLET BY MOUTH ONCE DAILY TABLET ORAL
Qty: 30 | Refills: 0 | Status: ACTIVE | COMMUNITY
Start: 2017-06-21

## 2022-08-22 RX ORDER — CETIRIZINE HYDROCHLORIDE 10 MG/1
1 TABLET TABLET, FILM COATED ORAL ONCE A DAY
Status: ACTIVE | COMMUNITY

## 2022-08-22 RX ORDER — FUROSEMIDE 40 MG/1
TAKE 1 TABLET PRN  DAILY TABLET ORAL
Refills: 0 | Status: ACTIVE | COMMUNITY
Start: 2011-09-18

## 2022-08-22 RX ORDER — HYDROCORTISONE VALERATE 2 MG/G
1 APPLICATION OINTMENT TOPICAL ONCE A DAY
Status: ACTIVE | COMMUNITY

## 2022-08-22 RX ORDER — FLUTICASONE PROPIONATE 50 UG/1
1 SPRAY IN EACH NOSTRIL SPRAY, METERED NASAL ONCE A DAY
Status: ACTIVE | COMMUNITY

## 2022-08-22 RX ORDER — ROSUVASTATIN CALCIUM 40 MG/1
TABLET, FILM COATED ORAL
Qty: 30 | Refills: 0 | Status: ACTIVE | COMMUNITY
Start: 2012-01-12

## 2022-08-22 RX ORDER — DAPAGLIFLOZIN 5 MG/1
1 TABLET TABLET, FILM COATED ORAL ONCE A DAY
Status: ACTIVE | COMMUNITY

## 2022-08-22 RX ORDER — LORAZEPAM 0.5 MG/1
TAKE 1 TABLET BY MOUTH AT BEDTIME AS NEEDED TABLET ORAL
Qty: 15 | Refills: 0 | Status: ACTIVE | COMMUNITY
Start: 2018-09-04

## 2022-08-22 RX ORDER — ONDANSETRON 8 MG/1
1 TABLET ODT EVERY 4-6 HOURS AS NEEDED FOR NAUSEA TABLET, ORALLY DISINTEGRATING ORAL
Qty: 40 | Refills: 2 | Status: ACTIVE | COMMUNITY
Start: 2018-10-25

## 2022-08-22 RX ORDER — CETIRIZINE HYDROCHLORIDE 10 MG/1
TAKE 1 TABLET BY MOUTH EVERY DAY TABLET, FILM COATED ORAL
Qty: 30 | Refills: 0 | Status: ON HOLD | COMMUNITY
Start: 2018-07-19

## 2022-08-22 RX ORDER — HYDROCODONE BITARTRATE AND ACETAMINOPHEN 10; 325 MG/1; MG/1
1 TABLET AS NEEDED TABLET ORAL
Status: ACTIVE | COMMUNITY

## 2022-08-22 RX ORDER — FLUTICASONE PROPIONATE AND SALMETEROL 50; 250 UG/1; UG/1
INHALE 1 PUFFS DAILY POWDER RESPIRATORY (INHALATION)
Refills: 0 | Status: ACTIVE | COMMUNITY

## 2022-08-22 RX ORDER — GABAPENTIN 300 MG/1
1 CAPSULE CAPSULE ORAL ONCE A DAY
Refills: 0 | Status: ACTIVE | COMMUNITY
Start: 2012-07-14

## 2022-08-22 RX ORDER — FAMOTIDINE 40 MG/1
1 TABLET AT BEDTIME TABLET, FILM COATED ORAL ONCE A DAY
OUTPATIENT

## 2022-08-22 RX ORDER — IRON HEME POLYPEPTIDE 12 MG
1 TABLET TABLET ORAL ONCE A DAY
Status: ACTIVE | COMMUNITY

## 2022-08-22 NOTE — HPI-TODAY'S VISIT:
Ms. Short is a 77-year-old woman with a history of chronic renal insufficiency, anemia of chronic disease on Procrit, T2DM presents for evaluation of a hiatal hernia. She was last seen 4/8/2022 for follow-up of lower abdominal pain and constipation suspected to be related to irritable bowel syndrome.  She was recommended a trial of Linzess 290 mcg daily and recent CT was requested for review.  A colonoscopy was considered for polyp surveillance pending optimization of her symptoms. CT of the abdomen and pelvis without contrast 3/30/2022 showed diverticulosis but was otherwise negative for any acute process. She is status post cholecystectomy. She has tried both Linzess and Amitiza since the time of her last visit, with little improvement in constipation. Her nephrologist started her on lactulose, which has been effective. She was taking the lactulose daily, but has been about to reduce this to every 3 days. She has a nonbloody stool daily to every other day. She continues to experience intermittent burning mid to lower abdominal pain, which is worsened following consumption of certain foods. She reports associated nausea without vomiting. Her reflux symptoms are managed with famotidine, however, she complains of recent difficulty swallowing. She indicates solid foods and pills will become hung midchest. She is able to facilitate passage with a sip of water. She is extremely hard of hearing; the history is somewhat difficult to obtain.

## 2022-10-21 ENCOUNTER — ERX REFILL RESPONSE (OUTPATIENT)
Dept: URBAN - METROPOLITAN AREA CLINIC 113 | Facility: CLINIC | Age: 77
End: 2022-10-21

## 2022-10-21 RX ORDER — DICYCLOMINE HYDROCHLORIDE 10 MG/1
1 CAPSULE CAPSULE ORAL
Qty: 60 | Refills: 1 | OUTPATIENT

## 2022-10-21 RX ORDER — DICYCLOMINE HYDROCHLORIDE 10 MG/1
1 CAPSULE ORALLY EVERY 4-6 HOURS AS NEEDED FOR ABDOMINAL PAIN 30 DAY(S) CAPSULE ORAL
Qty: 60 CAPSULE | Refills: 1 | OUTPATIENT

## 2022-11-28 ENCOUNTER — OFFICE VISIT (OUTPATIENT)
Dept: URBAN - METROPOLITAN AREA CLINIC 113 | Facility: CLINIC | Age: 77
End: 2022-11-28

## 2023-06-18 ENCOUNTER — ERX REFILL RESPONSE (OUTPATIENT)
Dept: URBAN - METROPOLITAN AREA CLINIC 113 | Facility: CLINIC | Age: 78
End: 2023-06-18

## 2023-06-18 RX ORDER — FAMOTIDINE 40 MG/1
1 TABLET AT BEDTIME TABLET, FILM COATED ORAL ONCE A DAY
OUTPATIENT

## 2023-06-18 RX ORDER — FAMOTIDINE 40 MG/1
TAKE 1 TABLET BY MOUTH EVERY DAY TABLET, FILM COATED ORAL
Qty: 90 TABLET | Refills: 3 | OUTPATIENT

## 2023-08-08 ENCOUNTER — TELEPHONE ENCOUNTER (OUTPATIENT)
Dept: URBAN - METROPOLITAN AREA CLINIC 113 | Facility: CLINIC | Age: 78
End: 2023-08-08

## 2023-08-14 ENCOUNTER — LAB OUTSIDE AN ENCOUNTER (OUTPATIENT)
Dept: URBAN - METROPOLITAN AREA CLINIC 72 | Facility: CLINIC | Age: 78
End: 2023-08-14

## 2023-08-14 ENCOUNTER — OFFICE VISIT (OUTPATIENT)
Dept: URBAN - METROPOLITAN AREA CLINIC 72 | Facility: CLINIC | Age: 78
End: 2023-08-14
Payer: MEDICARE

## 2023-08-14 VITALS
SYSTOLIC BLOOD PRESSURE: 118 MMHG | HEIGHT: 67 IN | HEART RATE: 83 BPM | BODY MASS INDEX: 28.09 KG/M2 | TEMPERATURE: 97.1 F | DIASTOLIC BLOOD PRESSURE: 73 MMHG | WEIGHT: 179 LBS | RESPIRATION RATE: 18 BRPM

## 2023-08-14 DIAGNOSIS — K59.09 CHRONIC CONSTIPATION: ICD-10-CM

## 2023-08-14 DIAGNOSIS — R13.10 DYSPHAGIA: ICD-10-CM

## 2023-08-14 DIAGNOSIS — K21.9 GERD: ICD-10-CM

## 2023-08-14 PROCEDURE — 99214 OFFICE O/P EST MOD 30 MIN: CPT | Performed by: NURSE PRACTITIONER

## 2023-08-14 RX ORDER — ALBUTEROL SULFATE 108 UG/1
AEROSOL, METERED RESPIRATORY (INHALATION)
Qty: 20 | Refills: 0 | Status: ACTIVE | COMMUNITY
Start: 2011-11-19

## 2023-08-14 RX ORDER — FEBUXOSTAT 40 MG/1
TAKE ONE TABLET BY MOUTH ONCE DAILY TABLET ORAL
Qty: 30 | Refills: 0 | Status: ACTIVE | COMMUNITY
Start: 2017-06-21

## 2023-08-14 RX ORDER — CETIRIZINE HYDROCHLORIDE 10 MG/1
TAKE 1 TABLET BY MOUTH EVERY DAY TABLET, FILM COATED ORAL
Qty: 30 | Refills: 0 | Status: ON HOLD | COMMUNITY
Start: 2018-07-19

## 2023-08-14 RX ORDER — HYDROCORTISONE VALERATE 2 MG/G
1 APPLICATION OINTMENT TOPICAL ONCE A DAY
Status: ACTIVE | COMMUNITY

## 2023-08-14 RX ORDER — DICYCLOMINE HYDROCHLORIDE 10 MG/1
1 CAPSULE ORALLY EVERY 4-6 HOURS AS NEEDED FOR ABDOMINAL PAIN 30 DAY(S) CAPSULE ORAL
Qty: 60 CAPSULE | Refills: 1 | Status: ACTIVE | COMMUNITY

## 2023-08-14 RX ORDER — FLUTICASONE PROPIONATE 50 UG/1
1 SPRAY IN EACH NOSTRIL SPRAY, METERED NASAL ONCE A DAY
Status: ACTIVE | COMMUNITY

## 2023-08-14 RX ORDER — FAMOTIDINE 40 MG/1
TAKE 1 TABLET BY MOUTH EVERY DAY TABLET, FILM COATED ORAL
Qty: 90 TABLET | Refills: 3 | Status: ACTIVE | COMMUNITY

## 2023-08-14 RX ORDER — CHLORTHALIDONE 25 MG/1
TAKE 1 TABLET DAILY TABLET ORAL
Refills: 0 | Status: ON HOLD | COMMUNITY
Start: 2011-12-10

## 2023-08-14 RX ORDER — FEBUXOSTAT 80 MG/1
TAKE 1 TABLET BY MOUTH EVERY DAY AS NEEDED TABLET ORAL
Qty: 30 | Refills: 0 | Status: ACTIVE | COMMUNITY
Start: 2018-11-01

## 2023-08-14 RX ORDER — ROSUVASTATIN CALCIUM 40 MG/1
TABLET, FILM COATED ORAL
Qty: 30 | Refills: 0 | Status: ACTIVE | COMMUNITY
Start: 2012-01-12

## 2023-08-14 RX ORDER — AZITHROMYCIN DIHYDRATE 250 MG/1
TAKE 2 TABLETS BY MOUTH TODAY, THEN TAKE 1 TABLET DAILY FOR 4 DAYS TABLET, FILM COATED ORAL
Qty: 6 | Refills: 0 | Status: ON HOLD | COMMUNITY
Start: 2019-01-20

## 2023-08-14 RX ORDER — FUROSEMIDE 40 MG/1
TAKE 1 TABLET PRN  DAILY TABLET ORAL
Refills: 0 | Status: ACTIVE | COMMUNITY
Start: 2011-09-18

## 2023-08-14 RX ORDER — LORAZEPAM 0.5 MG/1
TAKE 1 TABLET BY MOUTH AT BEDTIME AS NEEDED TABLET ORAL
Qty: 15 | Refills: 0 | Status: ACTIVE | COMMUNITY
Start: 2018-09-04

## 2023-08-14 RX ORDER — FLUTICASONE PROPIONATE AND SALMETEROL 50; 250 UG/1; UG/1
INHALE 1 PUFFS DAILY POWDER RESPIRATORY (INHALATION)
Refills: 0 | Status: ACTIVE | COMMUNITY

## 2023-08-14 RX ORDER — DAPAGLIFLOZIN 5 MG/1
1 TABLET TABLET, FILM COATED ORAL ONCE A DAY
Status: ACTIVE | COMMUNITY

## 2023-08-14 RX ORDER — IRON HEME POLYPEPTIDE 12 MG
1 TABLET TABLET ORAL ONCE A DAY
Status: ACTIVE | COMMUNITY

## 2023-08-14 RX ORDER — HYDROCODONE BITARTRATE AND ACETAMINOPHEN 10; 325 MG/1; MG/1
1 TABLET AS NEEDED TABLET ORAL
Status: ACTIVE | COMMUNITY

## 2023-08-14 RX ORDER — CETIRIZINE HYDROCHLORIDE 10 MG/1
1 TABLET TABLET, FILM COATED ORAL ONCE A DAY
Status: ACTIVE | COMMUNITY

## 2023-08-14 RX ORDER — ALBUTEROL SULFATE 0.63 MG/3ML
AS DIRECTED SOLUTION RESPIRATORY (INHALATION)
Status: ACTIVE | COMMUNITY

## 2023-08-14 RX ORDER — GABAPENTIN 300 MG/1
1 CAPSULE CAPSULE ORAL ONCE A DAY
Refills: 0 | Status: ACTIVE | COMMUNITY
Start: 2012-07-14

## 2023-08-14 RX ORDER — ONDANSETRON 8 MG/1
1 TABLET ODT EVERY 4-6 HOURS AS NEEDED FOR NAUSEA TABLET, ORALLY DISINTEGRATING ORAL
Qty: 40 | Refills: 2 | Status: ACTIVE | COMMUNITY
Start: 2018-10-25

## 2023-08-14 NOTE — HPI-TODAY'S VISIT:
Ms. Short is a 78-year-old woman with a history of chronic renal insufficiency, anemia of chronic disease on Procrit, T2DM presents for evaluation of a hiatal hernia.  Last seen 8/22/2022 for hiatal hernia, GERD, IBS constipation and dysphagia.  Started on dicyclomine.  For GERD continue famotidine and barium swallow ordered.  She reports she did not get that completed.    CT of the abdomen and pelvis without contrast 3/30/2022 showed diverticulosis but was otherwise negative for any acute process. She is status post cholecystectomy.  She has tried both Linzess and Amitiza since the time of her last visit, with little improvement in constipation. Her nephrologist started her on lactulose, which has been effective.   She called in recently and reports 10 pound weight loss, increased gas, bowel movement every other day normal for her on lactulose.  On famotidine daily.  Dicyclomine did not help.  Cannot take Tums or Gas-X due to kidney disease.  She feels her hiatal hernia is getting worse.  Is concerned about an ulcer.  On inteview today she repors she is having a problem digesting her food with dysphagia mid chest to solids and pills.   She had to eat a ritz cracker to get it to go down. No vomiting. Similar to symptoms last year.   Takes stool softener every other day.  She is no longer on the lactulose.  She reports early satiety and bloating. She is not able to eat much.  Denies hard stools, melena or hematochezia. Weight is actually up 4 pounds from her last appointment.  She is on the famotidine daily.  During her last EGD she had respiratory anesthesia complications ended up having to be sent over to [facility] and spent the night in the hospital.  She does report asthma that flares at times currently no trouble breathing. She does have some leg swelling.    Follows Dr. Bah-hematology.  Gets monthly Procrit from hematology.  On 4/27/2023 hemoglobin 10.8 serum iron 80.

## 2023-08-14 NOTE — PHYSICAL EXAM CONSTITUTIONAL:
normal , pleasant, well nourished, in no acute distress, needs assistance with ambulation-uses a walker

## 2023-08-15 ENCOUNTER — TELEPHONE ENCOUNTER (OUTPATIENT)
Dept: URBAN - METROPOLITAN AREA CLINIC 113 | Facility: CLINIC | Age: 78
End: 2023-08-15

## 2023-08-25 ENCOUNTER — TELEPHONE ENCOUNTER (OUTPATIENT)
Dept: URBAN - METROPOLITAN AREA CLINIC 72 | Facility: CLINIC | Age: 78
End: 2023-08-25

## 2023-08-25 RX ORDER — FLUTICASONE PROPIONATE AND SALMETEROL 50; 250 UG/1; UG/1
INHALE 1 PUFFS DAILY POWDER RESPIRATORY (INHALATION)
Refills: 0 | Status: ACTIVE | COMMUNITY

## 2023-08-25 RX ORDER — CHLORTHALIDONE 25 MG/1
TAKE 1 TABLET DAILY TABLET ORAL
Refills: 0 | Status: ON HOLD | COMMUNITY
Start: 2011-12-10

## 2023-08-25 RX ORDER — ROSUVASTATIN CALCIUM 40 MG/1
TABLET, FILM COATED ORAL
Qty: 30 | Refills: 0 | Status: ACTIVE | COMMUNITY
Start: 2012-01-12

## 2023-08-25 RX ORDER — ALBUTEROL SULFATE 0.63 MG/3ML
AS DIRECTED SOLUTION RESPIRATORY (INHALATION)
Status: ACTIVE | COMMUNITY

## 2023-08-25 RX ORDER — HYDROCODONE BITARTRATE AND ACETAMINOPHEN 10; 325 MG/1; MG/1
1 TABLET AS NEEDED TABLET ORAL
Status: ACTIVE | COMMUNITY

## 2023-08-25 RX ORDER — FEBUXOSTAT 40 MG/1
TAKE ONE TABLET BY MOUTH ONCE DAILY TABLET ORAL
Qty: 30 | Refills: 0 | Status: ACTIVE | COMMUNITY
Start: 2017-06-21

## 2023-08-25 RX ORDER — DAPAGLIFLOZIN 5 MG/1
1 TABLET TABLET, FILM COATED ORAL ONCE A DAY
Status: ACTIVE | COMMUNITY

## 2023-08-25 RX ORDER — LORAZEPAM 0.5 MG/1
TAKE 1 TABLET BY MOUTH AT BEDTIME AS NEEDED TABLET ORAL
Qty: 15 | Refills: 0 | Status: ACTIVE | COMMUNITY
Start: 2018-09-04

## 2023-08-25 RX ORDER — FAMOTIDINE 40 MG/1
TAKE 1 TABLET BY MOUTH EVERY DAY TABLET, FILM COATED ORAL
Qty: 90 TABLET | Refills: 3 | Status: ACTIVE | COMMUNITY

## 2023-08-25 RX ORDER — ONDANSETRON 8 MG/1
1 TABLET ODT EVERY 4-6 HOURS AS NEEDED FOR NAUSEA TABLET, ORALLY DISINTEGRATING ORAL
Qty: 40 | Refills: 2 | Status: ACTIVE | COMMUNITY
Start: 2018-10-25

## 2023-08-25 RX ORDER — FEBUXOSTAT 80 MG/1
TAKE 1 TABLET BY MOUTH EVERY DAY AS NEEDED TABLET ORAL
Qty: 30 | Refills: 0 | Status: ACTIVE | COMMUNITY
Start: 2018-11-01

## 2023-08-25 RX ORDER — ALBUTEROL SULFATE 108 UG/1
AEROSOL, METERED RESPIRATORY (INHALATION)
Qty: 20 | Refills: 0 | Status: ACTIVE | COMMUNITY
Start: 2011-11-19

## 2023-08-25 RX ORDER — FUROSEMIDE 40 MG/1
TAKE 1 TABLET PRN  DAILY TABLET ORAL
Refills: 0 | Status: ACTIVE | COMMUNITY
Start: 2011-09-18

## 2023-08-25 RX ORDER — DICYCLOMINE HYDROCHLORIDE 10 MG/1
1 CAPSULE ORALLY EVERY 4-6 HOURS AS NEEDED FOR ABDOMINAL PAIN 30 DAY(S) CAPSULE ORAL
Qty: 60 CAPSULE | Refills: 1 | Status: ACTIVE | COMMUNITY

## 2023-08-25 RX ORDER — FAMOTIDINE 40 MG/1
TAKE 1 TABLET BY MOUTH EVERY DAY TABLET, FILM COATED ORAL TWICE A DAY
Qty: 90 | Refills: 3

## 2023-08-25 RX ORDER — SUCRALFATE 1 G/1
1 TBLET CRUSH AND MAKE SLURRY TABLET ORAL
Qty: 120 | Refills: 3 | OUTPATIENT
Start: 2023-09-07 | End: 2024-01-04

## 2023-08-25 RX ORDER — CETIRIZINE HYDROCHLORIDE 10 MG/1
TAKE 1 TABLET BY MOUTH EVERY DAY TABLET, FILM COATED ORAL
Qty: 30 | Refills: 0 | Status: ON HOLD | COMMUNITY
Start: 2018-07-19

## 2023-08-25 RX ORDER — IRON HEME POLYPEPTIDE 12 MG
1 TABLET TABLET ORAL ONCE A DAY
Status: ACTIVE | COMMUNITY

## 2023-08-25 RX ORDER — CETIRIZINE HYDROCHLORIDE 10 MG/1
1 TABLET TABLET, FILM COATED ORAL ONCE A DAY
Status: ACTIVE | COMMUNITY

## 2023-08-25 RX ORDER — HYDROCORTISONE VALERATE 2 MG/G
1 APPLICATION OINTMENT TOPICAL ONCE A DAY
Status: ACTIVE | COMMUNITY

## 2023-08-25 RX ORDER — GABAPENTIN 300 MG/1
1 CAPSULE CAPSULE ORAL ONCE A DAY
Refills: 0 | Status: ACTIVE | COMMUNITY
Start: 2012-07-14

## 2023-08-25 RX ORDER — AZITHROMYCIN DIHYDRATE 250 MG/1
TAKE 2 TABLETS BY MOUTH TODAY, THEN TAKE 1 TABLET DAILY FOR 4 DAYS TABLET, FILM COATED ORAL
Qty: 6 | Refills: 0 | Status: ON HOLD | COMMUNITY
Start: 2019-01-20

## 2023-08-25 RX ORDER — SUCRALFATE 1 G/10ML
10 ML 1 HOUR BEFORE MEALS AND AT BEDTIME ON AN EMPTY STOMACH SUSPENSION ORAL
Qty: 600 MILLILITER | Refills: 1 | OUTPATIENT
Start: 2023-08-29 | End: 2023-10-28

## 2023-08-25 RX ORDER — FLUTICASONE PROPIONATE 50 UG/1
1 SPRAY IN EACH NOSTRIL SPRAY, METERED NASAL ONCE A DAY
Status: ACTIVE | COMMUNITY

## 2023-09-13 ENCOUNTER — ERX REFILL RESPONSE (OUTPATIENT)
Dept: URBAN - METROPOLITAN AREA CLINIC 72 | Facility: CLINIC | Age: 78
End: 2023-09-13

## 2023-09-13 RX ORDER — FAMOTIDINE 40 MG/1
TAKE 1 TABLET BY MOUTH EVERY DAY TABLET, FILM COATED ORAL TWICE A DAY
Qty: 90 | Refills: 3 | OUTPATIENT

## 2023-09-13 RX ORDER — FAMOTIDINE 40 MG/1
1 TABLET TABLET, FILM COATED ORAL TWICE A DAY
Qty: 120 TABLET | Refills: 1 | OUTPATIENT

## 2023-10-06 ENCOUNTER — ERX REFILL RESPONSE (OUTPATIENT)
Dept: URBAN - METROPOLITAN AREA CLINIC 72 | Facility: CLINIC | Age: 78
End: 2023-10-06

## 2023-10-06 ENCOUNTER — ERX REFILL RESPONSE (OUTPATIENT)
Dept: URBAN - METROPOLITAN AREA CLINIC 107 | Facility: CLINIC | Age: 78
End: 2023-10-06

## 2023-10-06 RX ORDER — SUCRALFATE 1 G/1
CRUSH AND MIX 1 TABLET WITH 15ML WATER AND TAKE 4 TIMES A DAY TABLET ORAL
Qty: 360 TABLET | Refills: 2 | OUTPATIENT

## 2023-10-06 RX ORDER — SUCRALFATE 1 G/1
CRUSH AND MIX 1 TABLET WITH 15ML WATER AND TAKE 4 TIMES A DAY TABLET ORAL
Qty: 360 TABLET | Refills: 1 | OUTPATIENT

## 2023-10-06 RX ORDER — FAMOTIDINE 40 MG/1
1 TABLET TABLET, FILM COATED ORAL TWICE A DAY
Qty: 120 TABLET | Refills: 1 | OUTPATIENT

## 2023-10-06 RX ORDER — FAMOTIDINE 40 MG/1
TAKE 1 TABLET BY MOUTH TWICE A DAY FOR 30 DAYS TABLET, FILM COATED ORAL
Qty: 180 TABLET | Refills: 1 | OUTPATIENT

## 2023-11-01 ENCOUNTER — OFFICE VISIT (OUTPATIENT)
Dept: URBAN - METROPOLITAN AREA CLINIC 113 | Facility: CLINIC | Age: 78
End: 2023-11-01
Payer: MEDICARE

## 2023-11-01 ENCOUNTER — DASHBOARD ENCOUNTERS (OUTPATIENT)
Age: 78
End: 2023-11-01

## 2023-11-01 VITALS
WEIGHT: 180 LBS | TEMPERATURE: 98.2 F | HEIGHT: 67 IN | BODY MASS INDEX: 28.25 KG/M2 | DIASTOLIC BLOOD PRESSURE: 85 MMHG | HEART RATE: 80 BPM | SYSTOLIC BLOOD PRESSURE: 164 MMHG

## 2023-11-01 DIAGNOSIS — K59.04 CHRONIC IDIOPATHIC CONSTIPATION: ICD-10-CM

## 2023-11-01 DIAGNOSIS — K21.9 GASTROESOPHAGEAL REFLUX DISEASE, UNSPECIFIED WHETHER ESOPHAGITIS PRESENT: ICD-10-CM

## 2023-11-01 PROCEDURE — 99213 OFFICE O/P EST LOW 20 MIN: CPT | Performed by: INTERNAL MEDICINE

## 2023-11-01 RX ORDER — FLUTICASONE PROPIONATE 50 UG/1
1 SPRAY IN EACH NOSTRIL SPRAY, METERED NASAL ONCE A DAY
Status: ACTIVE | COMMUNITY

## 2023-11-01 RX ORDER — GABAPENTIN 300 MG/1
1 CAPSULE CAPSULE ORAL ONCE A DAY
Refills: 0 | Status: ACTIVE | COMMUNITY
Start: 2012-07-14

## 2023-11-01 RX ORDER — CETIRIZINE HYDROCHLORIDE 10 MG/1
TAKE 1 TABLET BY MOUTH EVERY DAY TABLET, FILM COATED ORAL
Qty: 30 | Refills: 0 | Status: ON HOLD | COMMUNITY
Start: 2018-07-19

## 2023-11-01 RX ORDER — ALBUTEROL SULFATE 108 UG/1
AEROSOL, METERED RESPIRATORY (INHALATION)
Qty: 20 | Refills: 0 | Status: ACTIVE | COMMUNITY
Start: 2011-11-19

## 2023-11-01 RX ORDER — LORAZEPAM 0.5 MG/1
TAKE 1 TABLET BY MOUTH AT BEDTIME AS NEEDED TABLET ORAL
Qty: 15 | Refills: 0 | Status: ACTIVE | COMMUNITY
Start: 2018-09-04

## 2023-11-01 RX ORDER — FUROSEMIDE 40 MG/1
TAKE 1 TABLET PRN  DAILY TABLET ORAL
Refills: 0 | Status: ACTIVE | COMMUNITY
Start: 2011-09-18

## 2023-11-01 RX ORDER — HYDROCODONE BITARTRATE AND ACETAMINOPHEN 10; 325 MG/1; MG/1
1 TABLET AS NEEDED TABLET ORAL
Status: ACTIVE | COMMUNITY

## 2023-11-01 RX ORDER — FLUTICASONE PROPIONATE AND SALMETEROL 50; 250 UG/1; UG/1
INHALE 1 PUFFS DAILY POWDER RESPIRATORY (INHALATION)
Refills: 0 | Status: ACTIVE | COMMUNITY

## 2023-11-01 RX ORDER — DAPAGLIFLOZIN 5 MG/1
1 TABLET TABLET, FILM COATED ORAL ONCE A DAY
Status: ACTIVE | COMMUNITY

## 2023-11-01 RX ORDER — HYDROCORTISONE VALERATE 2 MG/G
1 APPLICATION OINTMENT TOPICAL ONCE A DAY
Status: ACTIVE | COMMUNITY

## 2023-11-01 RX ORDER — IRON HEME POLYPEPTIDE 12 MG
1 TABLET TABLET ORAL ONCE A DAY
Status: ACTIVE | COMMUNITY

## 2023-11-01 RX ORDER — DICYCLOMINE HYDROCHLORIDE 10 MG/1
1 CAPSULE ORALLY EVERY 4-6 HOURS AS NEEDED FOR ABDOMINAL PAIN 30 DAY(S) CAPSULE ORAL
Qty: 60 CAPSULE | Refills: 1 | Status: ACTIVE | COMMUNITY

## 2023-11-01 RX ORDER — SUCRALFATE 1 G/1
CRUSH AND MIX 1 TABLET WITH 15ML WATER AND TAKE 4 TIMES A DAY TABLET ORAL
Qty: 360 TABLET | Refills: 1 | Status: ACTIVE | COMMUNITY

## 2023-11-01 RX ORDER — ROSUVASTATIN CALCIUM 40 MG/1
TABLET, FILM COATED ORAL
Qty: 30 | Refills: 0 | Status: ACTIVE | COMMUNITY
Start: 2012-01-12

## 2023-11-01 RX ORDER — CETIRIZINE HYDROCHLORIDE 10 MG/1
1 TABLET TABLET, FILM COATED ORAL ONCE A DAY
Status: ACTIVE | COMMUNITY

## 2023-11-01 RX ORDER — ALBUTEROL SULFATE 0.63 MG/3ML
AS DIRECTED SOLUTION RESPIRATORY (INHALATION)
Status: ACTIVE | COMMUNITY

## 2023-11-01 RX ORDER — AZITHROMYCIN DIHYDRATE 250 MG/1
TAKE 2 TABLETS BY MOUTH TODAY, THEN TAKE 1 TABLET DAILY FOR 4 DAYS TABLET, FILM COATED ORAL
Qty: 6 | Refills: 0 | Status: ON HOLD | COMMUNITY
Start: 2019-01-20

## 2023-11-01 RX ORDER — FAMOTIDINE 40 MG/1
TAKE 1 TABLET TABLET, FILM COATED ORAL ONCE A DAY
OUTPATIENT

## 2023-11-01 RX ORDER — ONDANSETRON 8 MG/1
1 TABLET ODT EVERY 4-6 HOURS AS NEEDED FOR NAUSEA TABLET, ORALLY DISINTEGRATING ORAL
Qty: 40 | Refills: 2 | Status: ACTIVE | COMMUNITY
Start: 2018-10-25

## 2023-11-01 RX ORDER — FAMOTIDINE 40 MG/1
TAKE 1 TABLET BY MOUTH TWICE A DAY FOR 30 DAYS TABLET, FILM COATED ORAL
Qty: 180 TABLET | Refills: 1 | Status: ACTIVE | COMMUNITY

## 2023-11-01 RX ORDER — SUCRALFATE 1 G/1
CRUSH AND MIX 1 TABLET WITH 15ML WATER AND TAKE 4 TIMES A DAY TABLET ORAL
OUTPATIENT

## 2023-11-01 RX ORDER — LACTULOSE 10 G/15ML
15 ML AS NEEDED SOLUTION ORAL ONCE A DAY
OUTPATIENT

## 2023-11-01 RX ORDER — CHLORTHALIDONE 25 MG/1
TAKE 1 TABLET DAILY TABLET ORAL
Refills: 0 | Status: ON HOLD | COMMUNITY
Start: 2011-12-10

## 2023-11-01 RX ORDER — FEBUXOSTAT 80 MG/1
TAKE 1 TABLET BY MOUTH EVERY DAY AS NEEDED TABLET ORAL
Qty: 30 | Refills: 0 | Status: ACTIVE | COMMUNITY
Start: 2018-11-01

## 2023-11-01 RX ORDER — FEBUXOSTAT 40 MG/1
TAKE ONE TABLET BY MOUTH ONCE DAILY TABLET ORAL
Qty: 30 | Refills: 0 | Status: ACTIVE | COMMUNITY
Start: 2017-06-21

## 2023-11-01 NOTE — HPI-TODAY'S VISIT:
Ms. Short is a 78-year-old woman with a history of chronic renal insufficiency, anemia of chronic disease on Procrit, T2DM presents for follow up regarding GERD.  She is seen in the office on 8/14/2023 for follow-up regarding GERD, dysphagia and chronic constipation.  An upper GI series and abdominal x-ray were performed on 8/21/2023 with findings notable for gastroesophageal reflux and featureless gastric mucosa of unclear etiology/significance.She returns to the office reporting that she is overall feeling better.  She denies any issues of abdominal pain.  Her heartburn symptoms are well controlled on famotidine 40 mg every morning and sucralfate 4 times daily.  She believes she is taking lactulose with relief of constipation.  She is a bit unclear about her medications.  She denies any red blood per rectum or melena.  She is eating well and weight has been stable.

## 2023-11-16 PROBLEM — 82934008: Status: ACTIVE | Noted: 2023-11-16

## 2023-11-16 PROBLEM — 235595009: Status: ACTIVE | Noted: 2023-11-16

## 2024-10-15 ENCOUNTER — ERX REFILL RESPONSE (OUTPATIENT)
Dept: URBAN - METROPOLITAN AREA CLINIC 72 | Facility: CLINIC | Age: 79
End: 2024-10-15

## 2024-10-15 RX ORDER — FAMOTIDINE 40 MG/1
TAKE 1 TABLET BY MOUTH TWICE A DAY TABLET, FILM COATED ORAL
Qty: 180 TABLET | Refills: 1 | OUTPATIENT

## 2024-10-15 RX ORDER — FAMOTIDINE 40 MG/1
TAKE 1 TABLET BY MOUTH TWICE A DAY TABLET, FILM COATED ORAL
Qty: 180 TABLET | Refills: 0 | OUTPATIENT

## 2024-12-07 ENCOUNTER — ERX REFILL RESPONSE (OUTPATIENT)
Dept: URBAN - METROPOLITAN AREA CLINIC 72 | Facility: CLINIC | Age: 79
End: 2024-12-07

## 2024-12-07 RX ORDER — FAMOTIDINE 40 MG/1
TAKE 1 TABLET BY MOUTH TWICE A DAY TABLET, FILM COATED ORAL
Qty: 180 TABLET | Refills: 0 | OUTPATIENT

## 2024-12-07 RX ORDER — FAMOTIDINE 40 MG/1
TAKE 1 TABLET BY MOUTH TWICE A DAY TABLET, FILM COATED ORAL TWICE A DAY
Qty: 60 | Refills: 0 | OUTPATIENT

## 2025-01-13 ENCOUNTER — OFFICE VISIT (OUTPATIENT)
Dept: URBAN - METROPOLITAN AREA CLINIC 113 | Facility: CLINIC | Age: 80
End: 2025-01-13
Payer: MEDICARE

## 2025-01-13 VITALS
WEIGHT: 175.6 LBS | HEART RATE: 73 BPM | DIASTOLIC BLOOD PRESSURE: 44 MMHG | TEMPERATURE: 98.2 F | BODY MASS INDEX: 27.56 KG/M2 | SYSTOLIC BLOOD PRESSURE: 106 MMHG | RESPIRATION RATE: 20 BRPM | HEIGHT: 67 IN

## 2025-01-13 DIAGNOSIS — K59.04 CHRONIC IDIOPATHIC CONSTIPATION: ICD-10-CM

## 2025-01-13 DIAGNOSIS — K21.9 GASTROESOPHAGEAL REFLUX DISEASE, UNSPECIFIED WHETHER ESOPHAGITIS PRESENT: ICD-10-CM

## 2025-01-13 PROCEDURE — 99213 OFFICE O/P EST LOW 20 MIN: CPT

## 2025-01-13 RX ORDER — ALBUTEROL SULFATE 0.63 MG/3ML
AS DIRECTED SOLUTION RESPIRATORY (INHALATION)
Status: ACTIVE | COMMUNITY

## 2025-01-13 RX ORDER — IRON HEME POLYPEPTIDE 12 MG
1 TABLET TABLET ORAL ONCE A DAY
Status: ACTIVE | COMMUNITY

## 2025-01-13 RX ORDER — FUROSEMIDE 40 MG/1
TAKE 1 TABLET PRN  DAILY TABLET ORAL
Refills: 0 | Status: ACTIVE | COMMUNITY
Start: 2011-09-18

## 2025-01-13 RX ORDER — ALBUTEROL SULFATE 108 UG/1
AEROSOL, METERED RESPIRATORY (INHALATION)
Qty: 20 | Refills: 0 | Status: ACTIVE | COMMUNITY
Start: 2011-11-19

## 2025-01-13 RX ORDER — HYDROCODONE BITARTRATE AND ACETAMINOPHEN 10; 325 MG/1; MG/1
1 TABLET AS NEEDED TABLET ORAL
Status: ACTIVE | COMMUNITY

## 2025-01-13 RX ORDER — ONDANSETRON 8 MG/1
1 TABLET ODT EVERY 4-6 HOURS AS NEEDED FOR NAUSEA TABLET, ORALLY DISINTEGRATING ORAL
Qty: 40 | Refills: 2 | Status: ACTIVE | COMMUNITY
Start: 2018-10-25

## 2025-01-13 RX ORDER — SUCRALFATE 1 G/1
CRUSH AND MIX 1 TABLET WITH 15ML WATER AND TAKE 4 TIMES A DAY TABLET ORAL
OUTPATIENT

## 2025-01-13 RX ORDER — FAMOTIDINE 40 MG/1
TAKE 1 TABLET TABLET, FILM COATED ORAL ONCE A DAY
Qty: 90 TABLET | Refills: 3

## 2025-01-13 RX ORDER — FAMOTIDINE 40 MG/1
TAKE 1 TABLET BY MOUTH TWICE A DAY TABLET, FILM COATED ORAL TWICE A DAY
Qty: 60 | Refills: 0 | Status: ACTIVE | COMMUNITY

## 2025-01-13 RX ORDER — FEBUXOSTAT 40 MG/1
TAKE ONE TABLET BY MOUTH ONCE DAILY TABLET ORAL
Qty: 30 | Refills: 0 | Status: ACTIVE | COMMUNITY
Start: 2017-06-21

## 2025-01-13 RX ORDER — DICYCLOMINE HYDROCHLORIDE 10 MG/1
1 CAPSULE ORALLY EVERY 4-6 HOURS AS NEEDED FOR ABDOMINAL PAIN 30 DAY(S) CAPSULE ORAL
Qty: 60 CAPSULE | Refills: 1 | Status: ACTIVE | COMMUNITY

## 2025-01-13 RX ORDER — LORAZEPAM 0.5 MG/1
TAKE 1 TABLET BY MOUTH AT BEDTIME AS NEEDED TABLET ORAL
Qty: 15 | Refills: 0 | Status: ACTIVE | COMMUNITY
Start: 2018-09-04

## 2025-01-13 RX ORDER — DAPAGLIFLOZIN 5 MG/1
1 TABLET TABLET, FILM COATED ORAL ONCE A DAY
Status: ACTIVE | COMMUNITY

## 2025-01-13 RX ORDER — HYDROCORTISONE VALERATE 2 MG/G
1 APPLICATION OINTMENT TOPICAL ONCE A DAY
Status: ACTIVE | COMMUNITY

## 2025-01-13 RX ORDER — FLUTICASONE PROPIONATE AND SALMETEROL 50; 250 UG/1; UG/1
INHALE 1 PUFFS DAILY POWDER RESPIRATORY (INHALATION)
Refills: 0 | Status: ACTIVE | COMMUNITY

## 2025-01-13 RX ORDER — LACTULOSE 10 G/15ML
15 ML AS NEEDED SOLUTION ORAL ONCE A DAY
Status: ACTIVE | COMMUNITY

## 2025-01-13 RX ORDER — FLUTICASONE PROPIONATE 50 UG/1
1 SPRAY IN EACH NOSTRIL SPRAY, METERED NASAL ONCE A DAY
Status: ACTIVE | COMMUNITY

## 2025-01-13 RX ORDER — FEBUXOSTAT 80 MG/1
TAKE 1 TABLET BY MOUTH EVERY DAY AS NEEDED TABLET ORAL
Qty: 30 | Refills: 0 | Status: ACTIVE | COMMUNITY
Start: 2018-11-01

## 2025-01-13 RX ORDER — SUCRALFATE 1 G/1
CRUSH AND MIX 1 TABLET WITH 15ML WATER AND TAKE 4 TIMES A DAY TABLET ORAL
Status: ACTIVE | COMMUNITY

## 2025-01-13 RX ORDER — LACTULOSE 10 G/15ML
15 ML AS NEEDED SOLUTION ORAL ONCE A DAY
OUTPATIENT

## 2025-01-13 RX ORDER — CETIRIZINE HYDROCHLORIDE 10 MG/1
1 TABLET TABLET, FILM COATED ORAL ONCE A DAY
Status: ACTIVE | COMMUNITY

## 2025-01-13 RX ORDER — GABAPENTIN 300 MG/1
1 CAPSULE CAPSULE ORAL ONCE A DAY
Refills: 0 | Status: ACTIVE | COMMUNITY
Start: 2012-07-14

## 2025-01-13 RX ORDER — ROSUVASTATIN CALCIUM 40 MG/1
TABLET, FILM COATED ORAL
Qty: 30 | Refills: 0 | Status: ACTIVE | COMMUNITY
Start: 2012-01-12

## 2025-01-13 NOTE — HPI-TODAY'S VISIT:
Ms. Short is an 79-year-old woman with history of chronic renal insufficiency, anemia of chronic disease on Procrit, T2DM presenting for follow-up regarding her GERD symptoms.   She was last seen in office on 11/1/2023 for issues of chronic GERD/dyspepsia and constipation. GERD symptoms well controlled with famotidine 40 mg daily and sucralfate 1 GM 4 times daily. She was to trial decrease to sucralfate every 6-8 hours. Her constipation is controlled with lactulose daily.  Labs available for review from 12/18/2024 show WBC 2.75, hemoglobin 10.6, platelet count 111,000, LFTs normal.  She has very rare episodes of abdominal pain. Her bowels move regularly with lactulose. Her GERD symptoms are controlled. SHe is still using Sucralfate four times daily and would like to discuss other options. She denies nausea, vomiting, or fevers. Denies dark stool.

## 2025-03-09 ENCOUNTER — ERX REFILL RESPONSE (OUTPATIENT)
Dept: URBAN - METROPOLITAN AREA CLINIC 72 | Facility: CLINIC | Age: 80
End: 2025-03-09

## 2025-03-09 RX ORDER — FAMOTIDINE 40 MG/1
TAKE 1 TABLET BY MOUTH TWICE A DAY TWICE A DAY 30 DAYS TABLET, FILM COATED ORAL
Qty: 60 TABLET | Refills: 0 | OUTPATIENT

## 2025-03-09 RX ORDER — FAMOTIDINE 40 MG/1
TAKE 1 TABLET TABLET, FILM COATED ORAL ONCE A DAY
Qty: 90 TABLET | Refills: 3 | OUTPATIENT